# Patient Record
Sex: FEMALE | Race: BLACK OR AFRICAN AMERICAN | NOT HISPANIC OR LATINO | URBAN - METROPOLITAN AREA
[De-identification: names, ages, dates, MRNs, and addresses within clinical notes are randomized per-mention and may not be internally consistent; named-entity substitution may affect disease eponyms.]

---

## 2021-06-02 ENCOUNTER — APPOINTMENT (RX ONLY)
Dept: URBAN - METROPOLITAN AREA CLINIC 37 | Facility: CLINIC | Age: 21
Setting detail: DERMATOLOGY
End: 2021-06-02

## 2021-06-02 ENCOUNTER — APPOINTMENT (RX ONLY)
Dept: URBAN - METROPOLITAN AREA CLINIC 38 | Facility: CLINIC | Age: 21
Setting detail: DERMATOLOGY
End: 2021-06-02

## 2021-06-02 DIAGNOSIS — L65.9 NONSCARRING HAIR LOSS, UNSPECIFIED: ICD-10-CM | Status: INADEQUATELY CONTROLLED

## 2021-06-02 PROCEDURE — 99203 OFFICE O/P NEW LOW 30 MIN: CPT

## 2021-06-02 PROCEDURE — ? COUNSELING

## 2021-06-02 PROCEDURE — ? TREATMENT REGIMEN

## 2021-06-02 NOTE — HPI: HAIR LOSS
Previous Labs: No
How Did The Hair Loss Occur?: gradual in onset
How Severe Is Your Hair Loss?: moderate
What Hair Products Do You Use?: Salon type
Additional History: She states it initially started in high school.

## 2021-06-02 NOTE — PROCEDURE: COUNSELING
Detail Level: Simple
Patient Specific Counseling (Will Not Stick From Patient To Patient): \\nComplex presentation with a posterior area of hair loss but far from total alopecia. She agrees that she's seen significant regrowth and the two other smaller areas she noted were not visible on exam today. While this is most c/w alopecia areata based on age, good health, location, and lack of scalp inflammation, it's not definitive. However biopsy may not be useful if she's actively regrowing and will stick with simple treatment with minoxidil, avoiding topical steroids or TCI's so as not to take any chance of disturbing what appears to be spontaneous remission. recheck and can institute treatment or consider biopsy if it were to flare. \\n\\n****\\nWe discussed with patient that in hearing a story of a healthy young person with a patch of hair loss is generally alopecia areata, in most cases it burns out and goes a way. But most of the time it is completely bald, she has some hair growth. We discussed that she must be on the up swing of things at this point. Her Options are since she is improving at this point, we will monitor to see if it continues grow back, we can assume that this is alopecia areata and we could use something topical to continue to allow it to grow and see what happens, the last thing would be to be a biopsy. The down side is that the biopsy is most reliable when the condition is at its worst. In the healing stages the biopsy is not as definitive. At patient request we will do topical and followup in a few months and see how it improves. We discussed since it looks like the immune system is working and hair is growing the. We would recommend using otc Rogaine 5% for men once a day.

## 2021-08-04 ENCOUNTER — APPOINTMENT (RX ONLY)
Dept: URBAN - METROPOLITAN AREA CLINIC 37 | Facility: CLINIC | Age: 21
Setting detail: DERMATOLOGY
End: 2021-08-04

## 2021-08-04 ENCOUNTER — APPOINTMENT (RX ONLY)
Dept: URBAN - METROPOLITAN AREA CLINIC 38 | Facility: CLINIC | Age: 21
Setting detail: DERMATOLOGY
End: 2021-08-04

## 2021-08-04 DIAGNOSIS — L65.9 NONSCARRING HAIR LOSS, UNSPECIFIED: ICD-10-CM | Status: RESOLVING

## 2021-08-04 PROCEDURE — 99212 OFFICE O/P EST SF 10 MIN: CPT

## 2021-08-04 PROCEDURE — ? COUNSELING

## 2021-08-04 NOTE — PROCEDURE: COUNSELING
Patient Specific Counseling (Will Not Stick From Patient To Patient): Based on exam and comparison to photos, her presumptive alopecia areata appears to have fully regrown. NOted that this is a good prognostic sign but that recurrence is sill possible and to contact us immediately if loss is noted again.
Detail Level: Simple

## 2021-08-04 NOTE — PROCEDURE: COUNSELING
Detail Level: Simple
Patient Specific Counseling (Will Not Stick From Patient To Patient): Based on exam and comparison to photos, her presumptive alopecia areata appears to have fully regrown. NOted that this is a good prognostic sign but that recurrence is sill possible and to contact us immediately if loss is noted again.